# Patient Record
Sex: FEMALE | Race: WHITE | HISPANIC OR LATINO | Employment: OTHER | URBAN - METROPOLITAN AREA
[De-identification: names, ages, dates, MRNs, and addresses within clinical notes are randomized per-mention and may not be internally consistent; named-entity substitution may affect disease eponyms.]

---

## 2019-01-08 ENCOUNTER — TELEPHONE (OUTPATIENT)
Dept: SPORTS MEDICINE | Facility: CLINIC | Age: 44
End: 2019-01-08

## 2019-01-08 NOTE — TELEPHONE ENCOUNTER
----- Message from Fang Cano MA sent at 1/4/2019  5:18 PM CST -----  Contact: self      ----- Message -----  From: Talita Diop  Sent: 1/4/2019   4:31 PM  To: [facility] Staff    Patient states received a message to call the office Patient can be reach at

## 2019-01-08 NOTE — TELEPHONE ENCOUNTER
I called and spoke with the patient regarding the paperwork we received in the mail. She notes that the paperwork is to reference her case in 2015. She noes that she was a  and was not able to work due to her injury and surgery and that all of the forms that we received are referencing the past (2015) and not the present. I informed her that we would look into the paperwork

## 2019-02-11 ENCOUNTER — TELEPHONE (OUTPATIENT)
Dept: SPORTS MEDICINE | Facility: CLINIC | Age: 44
End: 2019-02-11

## 2019-02-11 NOTE — TELEPHONE ENCOUNTER
----- Message from Tia Prakash MA sent at 2/11/2019  1:30 PM CST -----  Contact: Self  I mailed the originals in the envelope that she left with the forms. I have a copy of them. How would she like me to get them to her? I cannot email. So she can , I can fax, or I can mail them.    Thanks,  Tia Prakash MA  [facility]    ----- Message -----  From: Claudia Rudolph MA  Sent: 2/11/2019   1:17 PM  To: KIANA Majory I spoke to this patient about her paperwork being completed and faxed. I checked her chart and did not see anything. Would you still have her paperwork?    Claudia Rudolph MA  Medical Assistant to Dr. Nancy Valencia    ----- Message -----  From: Nargis Patterson  Sent: 2/11/2019  10:22 AM  To: [facility] Staff    Pt called requesting a call back from [facility] regarding her claim and could be reached at [phone]

## 2019-07-11 ENCOUNTER — TELEPHONE (OUTPATIENT)
Dept: SPORTS MEDICINE | Facility: CLINIC | Age: 44
End: 2019-07-11

## 2019-07-11 NOTE — TELEPHONE ENCOUNTER
Called Ar[facility] Steele to schedule an appointment.  I was unable to reach the patient, I left patient a voicemail to return my call.    Luke Cotton   Sports Medicine Assistant   [facility] [place]

## 2019-10-24 ENCOUNTER — TELEPHONE (OUTPATIENT)
Dept: SPORTS MEDICINE | Facility: CLINIC | Age: 44
End: 2019-10-24

## 2019-10-24 NOTE — TELEPHONE ENCOUNTER
Called patient back to get more information as well as to check the insurance. No answer left voicemail.

## 2019-10-24 NOTE — TELEPHONE ENCOUNTER
----- Message from Fang Cano MA sent at 10/23/2019  5:23 PM CDT -----  Contact: pt  Please call patient to see where pain starts. If elbow will see. If neck needs to see spine    Fang Cano   Clinical assistant to Dr. Nancy Valencia    ----- Message -----  From: Nia Augustine  Sent: 10/23/2019   2:05 PM CDT  To: [facility] Staff    Doctor did surgery  For this pt    -Right arm   Pain in elbow to neck to shoulder    Problem exist for a month now   - dull ache but no numbness       Do you see patients for elbows  ?   If not,  Please supply some names of elbow doctors      [phone]  Thanks

## 2020-09-24 DIAGNOSIS — Z03.818 ENCOUNTER FOR OBSERVATION FOR SUSPECTED EXPOSURE TO OTHER BIOLOGICAL AGENTS RULED OUT: ICD-10-CM

## 2020-09-25 ENCOUNTER — LAB VISIT (OUTPATIENT)
Dept: URGENT CARE | Facility: CLINIC | Age: 45
End: 2020-09-25
Payer: COMMERCIAL

## 2020-09-25 DIAGNOSIS — Z03.818 ENCOUNTER FOR OBSERVATION FOR SUSPECTED EXPOSURE TO OTHER BIOLOGICAL AGENTS RULED OUT: ICD-10-CM

## 2020-09-25 PROCEDURE — U0003 INFECTIOUS AGENT DETECTION BY NUCLEIC ACID (DNA OR RNA); SEVERE ACUTE RESPIRATORY SYNDROME CORONAVIRUS 2 (SARS-COV-2) (CORONAVIRUS DISEASE [COVID-19]), AMPLIFIED PROBE TECHNIQUE, MAKING USE OF HIGH THROUGHPUT TECHNOLOGIES AS DESCRIBED BY CMS-2020-01-R: HCPCS

## 2020-09-26 DIAGNOSIS — Z03.818 ENCOUNTER FOR OBSERVATION FOR SUSPECTED EXPOSURE TO OTHER BIOLOGICAL AGENTS RULED OUT: ICD-10-CM

## 2020-09-26 LAB — SARS-COV-2 RNA RESP QL NAA+PROBE: NOT DETECTED

## 2020-09-28 ENCOUNTER — LAB VISIT (OUTPATIENT)
Dept: URGENT CARE | Facility: CLINIC | Age: 45
End: 2020-09-28

## 2020-09-28 DIAGNOSIS — Z03.818 ENCOUNTER FOR OBSERVATION FOR SUSPECTED EXPOSURE TO OTHER BIOLOGICAL AGENTS RULED OUT: ICD-10-CM

## 2020-09-28 LAB
CTP QC/QA: YES
SARS-COV-2 RDRP RESP QL NAA+PROBE: NEGATIVE

## 2020-09-28 PROCEDURE — U0002: ICD-10-PCS | Mod: QW,S$GLB,, | Performed by: EMERGENCY MEDICINE

## 2020-09-28 PROCEDURE — U0002 COVID-19 LAB TEST NON-CDC: HCPCS | Mod: QW,S$GLB,, | Performed by: EMERGENCY MEDICINE

## 2021-04-26 ENCOUNTER — PATIENT MESSAGE (OUTPATIENT)
Dept: RESEARCH | Facility: HOSPITAL | Age: 46
End: 2021-04-26